# Patient Record
Sex: MALE | Race: WHITE | NOT HISPANIC OR LATINO | URBAN - METROPOLITAN AREA
[De-identification: names, ages, dates, MRNs, and addresses within clinical notes are randomized per-mention and may not be internally consistent; named-entity substitution may affect disease eponyms.]

---

## 2020-08-31 PROBLEM — Z00.00 ENCOUNTER FOR PREVENTIVE HEALTH EXAMINATION: Status: ACTIVE | Noted: 2020-08-31

## 2020-09-08 ENCOUNTER — OUTPATIENT (OUTPATIENT)
Dept: OUTPATIENT SERVICES | Facility: HOSPITAL | Age: 60
LOS: 1 days | End: 2020-09-08
Payer: COMMERCIAL

## 2020-09-08 ENCOUNTER — APPOINTMENT (OUTPATIENT)
Dept: ORTHOPEDIC SURGERY | Facility: CLINIC | Age: 60
End: 2020-09-08
Payer: COMMERCIAL

## 2020-09-08 ENCOUNTER — RESULT REVIEW (OUTPATIENT)
Age: 60
End: 2020-09-08

## 2020-09-08 DIAGNOSIS — M17.0 BILATERAL PRIMARY OSTEOARTHRITIS OF KNEE: ICD-10-CM

## 2020-09-08 RX ORDER — MELOXICAM 7.5 MG/1
7.5 TABLET ORAL TWICE DAILY
Qty: 60 | Refills: 1 | Status: ACTIVE | COMMUNITY
Start: 2020-09-08 | End: 1900-01-01

## 2020-09-09 ENCOUNTER — TRANSCRIPTION ENCOUNTER (OUTPATIENT)
Age: 60
End: 2020-09-09

## 2020-09-13 NOTE — PHYSICAL EXAM
[de-identified] : General: Not in acute distress, dressed appropriately, sitting on examination table\par Skin: Warm and dry, normal turgor, no rashes\par Neurological: AOx3, Cranial nerves grossly in tact\par Psych: Mood and affect appropriate\par \par Right Knee: Alignment: Neutral Tender: Anteriorly ROM: 0-110 Stable. 5/5 Strength. DNVI. Ambulates with no assisted devices. \par \par Left Knee: Alignment: Neutral Tender: Anteriorly ROM: 0-110 Stable. 5/5 Strength. DNVI. Ambulates with no assisted devices. \par  [de-identified] : X-ray of bilateral knees shows medial compartment OA.

## 2020-09-13 NOTE — HISTORY OF PRESENT ILLNESS
[de-identified] : 61 y/o M, here for initial visit of bilateral knee pain (L>R). Pain has been ongoing for several years, progressively getting worse for the past 4 months. Pt has tried physical therapy in the past but did not find it helpful. Pt has tried Ibuprofen, Tylenol, Aspirin, and Motrin that provides some relief. He has tried Meloxicam in the past that was helpful with pain relief. Pt had injections in the knees a couple of years ago  that was helpful with the pain as well. Pain is better at rest and worse with ambulation. History of hernia disc.

## 2020-09-13 NOTE — REVIEW OF SYSTEMS
[Joint Pain] : joint pain [Negative] : Endocrine [Arthralgia] : no arthralgia [Joint Swelling] : no joint swelling [Joint Stiffness] : no joint stiffness

## 2020-09-13 NOTE — PROCEDURE
[de-identified] : After obtaining verbal consent and under normal sterile conditions the left knee joint was injected with 4 cc of lidocaine and 1 cc of 40 mg per mL of Kenalog.\par \par After obtaining verbal consent and under normal sterile conditions the right knee joint was injected with 4 cc of lidocaine and 1 cc of 40 mg per mL of Kenalog.\par \par \par

## 2020-09-13 NOTE — ASSESSMENT
[FreeTextEntry1] : Assessment\par Bilateral knee OA\par \par Plan\par The bilateral knees was injected as described above, today they will rest ice and use Tylenol as needed for pain. \par Will prescribe Meloxicam for pain management\par \par F/U in 3 months \par \par All medical record entries made by the PA/Dominga/Fellow are at my, Dr. Lavon Yanes's direction and personally dictated by me on 09/08/2020]. I have reviewed the chart and agree that the record accurately reflects my personal performance of the history, physical exam, assessment, and plan. I have also personally directed reviewed, and agreed with the chart.\par \par

## 2021-10-27 ENCOUNTER — APPOINTMENT (OUTPATIENT)
Dept: OPHTHALMOLOGY | Facility: CLINIC | Age: 61
End: 2021-10-27
Payer: COMMERCIAL

## 2021-10-27 ENCOUNTER — NON-APPOINTMENT (OUTPATIENT)
Age: 61
End: 2021-10-27

## 2021-12-14 ENCOUNTER — APPOINTMENT (OUTPATIENT)
Dept: OPHTHALMOLOGY | Facility: AMBULATORY SURGERY CENTER | Age: 61
End: 2021-12-14

## 2021-12-21 ENCOUNTER — APPOINTMENT (OUTPATIENT)
Dept: OPHTHALMOLOGY | Facility: AMBULATORY SURGERY CENTER | Age: 61
End: 2021-12-21

## 2025-08-25 ENCOUNTER — APPOINTMENT (OUTPATIENT)
Dept: OTOLARYNGOLOGY | Facility: CLINIC | Age: 65
End: 2025-08-25
Payer: COMMERCIAL

## 2025-08-25 ENCOUNTER — NON-APPOINTMENT (OUTPATIENT)
Age: 65
End: 2025-08-25

## 2025-08-25 VITALS
WEIGHT: 285 LBS | HEIGHT: 69 IN | DIASTOLIC BLOOD PRESSURE: 73 MMHG | SYSTOLIC BLOOD PRESSURE: 131 MMHG | BODY MASS INDEX: 42.21 KG/M2 | HEART RATE: 56 BPM | TEMPERATURE: 98 F | OXYGEN SATURATION: 99 %

## 2025-08-25 DIAGNOSIS — R68.89 OTHER GENERAL SYMPTOMS AND SIGNS: ICD-10-CM

## 2025-08-25 DIAGNOSIS — Z80.9 FAMILY HISTORY OF MALIGNANT NEOPLASM, UNSPECIFIED: ICD-10-CM

## 2025-08-25 DIAGNOSIS — H92.01 OTALGIA, RIGHT EAR: ICD-10-CM

## 2025-08-25 DIAGNOSIS — Z86.79 PERSONAL HISTORY OF OTHER DISEASES OF THE CIRCULATORY SYSTEM: ICD-10-CM

## 2025-08-25 DIAGNOSIS — Z78.9 OTHER SPECIFIED HEALTH STATUS: ICD-10-CM

## 2025-08-25 DIAGNOSIS — H69.90 UNSPECIFIED EUSTACHIAN TUBE DISORDER, UNSPECIFIED EAR: ICD-10-CM

## 2025-08-25 PROCEDURE — 92550 TYMPANOMETRY & REFLEX THRESH: CPT | Mod: 52

## 2025-08-25 PROCEDURE — 92557 COMPREHENSIVE HEARING TEST: CPT

## 2025-08-25 PROCEDURE — 99203 OFFICE O/P NEW LOW 30 MIN: CPT | Mod: 25

## 2025-08-25 PROCEDURE — 31231 NASAL ENDOSCOPY DX: CPT

## 2025-09-17 ENCOUNTER — NON-APPOINTMENT (OUTPATIENT)
Age: 65
End: 2025-09-17